# Patient Record
Sex: FEMALE | Race: WHITE | NOT HISPANIC OR LATINO | Employment: OTHER | URBAN - METROPOLITAN AREA
[De-identification: names, ages, dates, MRNs, and addresses within clinical notes are randomized per-mention and may not be internally consistent; named-entity substitution may affect disease eponyms.]

---

## 2017-01-01 ENCOUNTER — ANTI-COAG VISIT (OUTPATIENT)
Dept: CARDIOLOGY | Facility: CLINIC | Age: 82
End: 2017-01-01
Payer: MEDICARE

## 2017-01-01 ENCOUNTER — CLINICAL SUPPORT (OUTPATIENT)
Dept: CARDIOLOGY | Facility: CLINIC | Age: 82
End: 2017-01-01
Payer: MEDICARE

## 2017-01-01 ENCOUNTER — ANTI-COAG VISIT (OUTPATIENT)
Dept: CARDIOLOGY | Facility: CLINIC | Age: 82
End: 2017-01-01

## 2017-01-01 ENCOUNTER — OFFICE VISIT (OUTPATIENT)
Dept: CARDIOLOGY | Facility: CLINIC | Age: 82
End: 2017-01-01
Payer: MEDICARE

## 2017-01-01 ENCOUNTER — DOCUMENTATION ONLY (OUTPATIENT)
Dept: CARDIOLOGY | Facility: CLINIC | Age: 82
End: 2017-01-01

## 2017-01-01 ENCOUNTER — TELEPHONE (OUTPATIENT)
Dept: CARDIOLOGY | Facility: CLINIC | Age: 82
End: 2017-01-01

## 2017-01-01 ENCOUNTER — OFFICE VISIT (OUTPATIENT)
Dept: PODIATRY | Facility: CLINIC | Age: 82
End: 2017-01-01
Payer: MEDICARE

## 2017-01-01 ENCOUNTER — LAB VISIT (OUTPATIENT)
Dept: LAB | Facility: HOSPITAL | Age: 82
End: 2017-01-01
Attending: INTERNAL MEDICINE
Payer: MEDICARE

## 2017-01-01 VITALS
HEART RATE: 70 BPM | WEIGHT: 166.88 LBS | SYSTOLIC BLOOD PRESSURE: 125 MMHG | BODY MASS INDEX: 25.29 KG/M2 | DIASTOLIC BLOOD PRESSURE: 77 MMHG | HEIGHT: 68 IN

## 2017-01-01 VITALS
HEART RATE: 69 BPM | DIASTOLIC BLOOD PRESSURE: 83 MMHG | WEIGHT: 167.13 LBS | HEIGHT: 68 IN | BODY MASS INDEX: 25.33 KG/M2 | SYSTOLIC BLOOD PRESSURE: 130 MMHG

## 2017-01-01 VITALS — WEIGHT: 169.31 LBS | HEIGHT: 68 IN | BODY MASS INDEX: 25.66 KG/M2

## 2017-01-01 DIAGNOSIS — Z95.0 CARDIAC PACEMAKER IN SITU: Primary | ICD-10-CM

## 2017-01-01 DIAGNOSIS — Z79.01 LONG TERM (CURRENT) USE OF ANTICOAGULANTS: Primary | ICD-10-CM

## 2017-01-01 DIAGNOSIS — N18.4 HYPERTENSION ASSOCIATED WITH STAGE 4 CHRONIC KIDNEY DISEASE DUE TO TYPE 2 DIABETES MELLITUS: ICD-10-CM

## 2017-01-01 DIAGNOSIS — I48.20 ATRIAL FIBRILLATION, CHRONIC: Primary | ICD-10-CM

## 2017-01-01 DIAGNOSIS — D51.9 VITAMIN B12 DEFICIENCY ANEMIA: ICD-10-CM

## 2017-01-01 DIAGNOSIS — E11.22 HYPERTENSION ASSOCIATED WITH STAGE 4 CHRONIC KIDNEY DISEASE DUE TO TYPE 2 DIABETES MELLITUS: ICD-10-CM

## 2017-01-01 DIAGNOSIS — R00.1 BRADYCARDIA, SINUS: Chronic | ICD-10-CM

## 2017-01-01 DIAGNOSIS — E88.09 OTHER DISORDERS OF PLASMA PROTEIN METABOLISM: ICD-10-CM

## 2017-01-01 DIAGNOSIS — Z95.1 S/P CABG (CORONARY ARTERY BYPASS GRAFT): ICD-10-CM

## 2017-01-01 DIAGNOSIS — E55.9 UNSPECIFIED VITAMIN D DEFICIENCY: ICD-10-CM

## 2017-01-01 DIAGNOSIS — Z79.01 LONG TERM (CURRENT) USE OF ANTICOAGULANTS: ICD-10-CM

## 2017-01-01 DIAGNOSIS — Z95.0 PACEMAKER: Chronic | ICD-10-CM

## 2017-01-01 DIAGNOSIS — I12.9 HYPERTENSION ASSOCIATED WITH STAGE 4 CHRONIC KIDNEY DISEASE DUE TO TYPE 2 DIABETES MELLITUS: ICD-10-CM

## 2017-01-01 DIAGNOSIS — I48.20 ATRIAL FIBRILLATION, CHRONIC: ICD-10-CM

## 2017-01-01 DIAGNOSIS — Z95.0 CARDIAC PACEMAKER IN SITU: ICD-10-CM

## 2017-01-01 DIAGNOSIS — I25.110 CORONARY ARTERY DISEASE INVOLVING NATIVE CORONARY ARTERY OF NATIVE HEART WITH UNSTABLE ANGINA PECTORIS: ICD-10-CM

## 2017-01-01 DIAGNOSIS — I10 ESSENTIAL HYPERTENSION: ICD-10-CM

## 2017-01-01 DIAGNOSIS — I50.23 ACUTE ON CHRONIC SYSTOLIC CONGESTIVE HEART FAILURE: ICD-10-CM

## 2017-01-01 DIAGNOSIS — Z09 FOLLOW-UP EXAMINATION FOLLOWING SURGERY: Primary | ICD-10-CM

## 2017-01-01 DIAGNOSIS — N20.0 URIC ACID NEPHROLITHIASIS: ICD-10-CM

## 2017-01-01 DIAGNOSIS — I42.0 ISCHEMIC DILATED CARDIOMYOPATHY: ICD-10-CM

## 2017-01-01 DIAGNOSIS — E61.2 MAGNESIUM DEFICIENCY: ICD-10-CM

## 2017-01-01 DIAGNOSIS — I10 ESSENTIAL HYPERTENSION, MALIGNANT: ICD-10-CM

## 2017-01-01 DIAGNOSIS — N18.30 CRF (CHRONIC RENAL FAILURE), STAGE 3 (MODERATE): ICD-10-CM

## 2017-01-01 DIAGNOSIS — E87.1 HYPOSMOLALITY AND/OR HYPONATREMIA: ICD-10-CM

## 2017-01-01 DIAGNOSIS — I25.5 ISCHEMIC DILATED CARDIOMYOPATHY: ICD-10-CM

## 2017-01-01 DIAGNOSIS — N25.81 SECONDARY HYPERPARATHYROIDISM (OF RENAL ORIGIN): Primary | ICD-10-CM

## 2017-01-01 DIAGNOSIS — E11.9 TYPE II OR UNSPECIFIED TYPE DIABETES MELLITUS WITHOUT MENTION OF COMPLICATION, NOT STATED AS UNCONTROLLED: ICD-10-CM

## 2017-01-01 LAB
ALBUMIN SERPL BCP-MCNC: 3.5 G/DL
ANION GAP SERPL CALC-SCNC: 5 MMOL/L
AORTIC VALVE REGURGITATION: ABNORMAL
BASOPHILS # BLD AUTO: 0.02 K/UL
BASOPHILS NFR BLD: 0.2 %
BUN SERPL-MCNC: 37 MG/DL
CALCIUM SERPL-MCNC: 9.6 MG/DL
CHLORIDE SERPL-SCNC: 99 MMOL/L
CO2 SERPL-SCNC: 27 MMOL/L
CREAT SERPL-MCNC: 2.2 MG/DL
DIASTOLIC DYSFUNCTION: YES
DIFFERENTIAL METHOD: ABNORMAL
EOSINOPHIL # BLD AUTO: 0.5 K/UL
EOSINOPHIL NFR BLD: 5.4 %
ERYTHROCYTE [DISTWIDTH] IN BLOOD BY AUTOMATED COUNT: 14.5 %
EST. GFR  (AFRICAN AMERICAN): 23.2 ML/MIN/1.73 M^2
EST. GFR  (NON AFRICAN AMERICAN): 20.1 ML/MIN/1.73 M^2
ESTIMATED AVG GLUCOSE: 128 MG/DL
ESTIMATED PA SYSTOLIC PRESSURE: 44
GLUCOSE SERPL-MCNC: 133 MG/DL
HBA1C MFR BLD HPLC: 6.1 %
HCT VFR BLD AUTO: 39.1 %
HGB BLD-MCNC: 13.6 G/DL
INR PPP: 1.5 (ref 2–3)
INR PPP: 1.7
INR PPP: 1.7 (ref 2–3)
INR PPP: 1.7 (ref 2–3)
INR PPP: 2.6 (ref 2–3)
LYMPHOCYTES # BLD AUTO: 1.5 K/UL
LYMPHOCYTES NFR BLD: 18.2 %
MCH RBC QN AUTO: 31.3 PG
MCHC RBC AUTO-ENTMCNC: 34.8 %
MCV RBC AUTO: 90 FL
MITRAL VALVE MOBILITY: NORMAL
MITRAL VALVE REGURGITATION: ABNORMAL
MONOCYTES # BLD AUTO: 0.6 K/UL
MONOCYTES NFR BLD: 7.4 %
NEUTROPHILS # BLD AUTO: 5.7 K/UL
NEUTROPHILS NFR BLD: 68.6 %
PHOSPHATE SERPL-MCNC: 2.6 MG/DL
PLATELET # BLD AUTO: 200 K/UL
PMV BLD AUTO: 9.6 FL
POTASSIUM SERPL-SCNC: 5.8 MMOL/L
PTH-INTACT SERPL-MCNC: 135 PG/ML
RBC # BLD AUTO: 4.35 M/UL
RETIRED EF AND QEF - SEE NOTES: 30 (ref 55–65)
SODIUM SERPL-SCNC: 131 MMOL/L
TRICUSPID VALVE REGURGITATION: ABNORMAL
WBC # BLD AUTO: 8.29 K/UL

## 2017-01-01 PROCEDURE — 85025 COMPLETE CBC W/AUTO DIFF WBC: CPT

## 2017-01-01 PROCEDURE — 80069 RENAL FUNCTION PANEL: CPT

## 2017-01-01 PROCEDURE — 99215 OFFICE O/P EST HI 40 MIN: CPT | Mod: S$PBB,,, | Performed by: INTERNAL MEDICINE

## 2017-01-01 PROCEDURE — 99999 PR PBB SHADOW E&M-EST. PATIENT-LVL II: CPT | Mod: PBBFAC,,, | Performed by: INTERNAL MEDICINE

## 2017-01-01 PROCEDURE — 99211 OFF/OP EST MAY X REQ PHY/QHP: CPT | Mod: PBBFAC,27,PO | Performed by: PHARMACIST

## 2017-01-01 PROCEDURE — 83970 ASSAY OF PARATHORMONE: CPT

## 2017-01-01 PROCEDURE — 99999 PR PBB SHADOW E&M-EST. PATIENT-LVL I: CPT | Mod: PBBFAC,,, | Performed by: PHARMACIST

## 2017-01-01 PROCEDURE — 85610 PROTHROMBIN TIME: CPT | Mod: PBBFAC,PO | Performed by: PHARMACIST

## 2017-01-01 PROCEDURE — 99211 OFF/OP EST MAY X REQ PHY/QHP: CPT | Mod: PBBFAC,PO | Performed by: PHARMACIST

## 2017-01-01 PROCEDURE — 83036 HEMOGLOBIN GLYCOSYLATED A1C: CPT

## 2017-01-01 PROCEDURE — 99024 POSTOP FOLLOW-UP VISIT: CPT | Mod: ,,,

## 2017-01-01 PROCEDURE — 99214 OFFICE O/P EST MOD 30 MIN: CPT | Mod: S$PBB,,, | Performed by: INTERNAL MEDICINE

## 2017-01-01 PROCEDURE — 99212 OFFICE O/P EST SF 10 MIN: CPT | Mod: PBBFAC,PO

## 2017-01-01 PROCEDURE — 93280 PM DEVICE PROGR EVAL DUAL: CPT | Mod: PBBFAC,PO | Performed by: INTERNAL MEDICINE

## 2017-01-01 PROCEDURE — 99999 PR PBB SHADOW E&M-EST. PATIENT-LVL III: CPT | Mod: PBBFAC,,, | Performed by: INTERNAL MEDICINE

## 2017-01-01 PROCEDURE — 36415 COLL VENOUS BLD VENIPUNCTURE: CPT | Mod: PO

## 2017-01-01 PROCEDURE — 99999 PR PBB SHADOW E&M-EST. PATIENT-LVL II: CPT | Mod: PBBFAC,,,

## 2017-01-01 PROCEDURE — 93306 TTE W/DOPPLER COMPLETE: CPT | Mod: PBBFAC,PO | Performed by: INTERNAL MEDICINE

## 2017-01-01 PROCEDURE — 99212 OFFICE O/P EST SF 10 MIN: CPT | Mod: PBBFAC,PO | Performed by: INTERNAL MEDICINE

## 2017-01-01 RX ORDER — DILTIAZEM HYDROCHLORIDE 240 MG/1
240 CAPSULE, COATED, EXTENDED RELEASE ORAL NIGHTLY
Status: ON HOLD | COMMUNITY
Start: 2017-01-01 | End: 2017-01-01

## 2017-01-19 NOTE — PROGRESS NOTES
Chief Complaint   Patient presents with    Nail Problem     Left Gr.toe  PCP [id number]/7/16 A1C 11/4/16 5.9     Patient returns status post partial nail avulsiomn  x 2 weeks.  Patient states doing well. Patient is doing the soaks in Epsom salt and local wound care with triple antibiotic and a Band-aid. Patient has decreased pain, swelling and redness.     PE:    Avulsion site looks clean without any signs of cryptosis, edema or erythema.    Assessment:  Doing well postoperatively.        Plan:   Avulsion site was cleansed and debrided.  Discontinue the soaks. Continue the antifungal cream. Return to clinic 3 months diabetic foot care.

## 2017-01-19 NOTE — MR AVS SNAPSHOT
[facility]  [address]  [place] [place] [address]  Phone: [phone]                  Paty Romero   2017 2:45 PM   Office Visit    Description:  Female : 1933   Provider:  Iglesia Winkler DPM   Department:  [facility]           Reason for Visit     Nail Problem                To Do List           Future Appointments        Provider Department Dept Phone    2017 1:00 PM PACEMAKER [facility] [phone]    2017 1:40 PM Malik Lopez MD [facility] Arrhythmia [phone]    2017 1:45 PM Yuliet Lay, PharmD [facility] [phone]    2017 1:45 PM Lissy Coleman MD [facility] Dermatology [phone]    2017 9:45 AM Jcak Marsh MD [facility] Cardiology [phone]      Goals (5 Years of Data)     None      [facility]     [facility] -  Assistance  Registered nurses in the [facility] provide clinical advisement, health education, appointment booking, and other advisory services.  Call for this free service at [phone].             Medications           Message regarding Medications     Verify the changes and/or additions to your medication regime listed below are the same as discussed with your clinician today.  If any of these changes or additions are incorrect, please notify your healthcare provider.             Verify that the below list of medications is an accurate representation of the medications you are currently taking.  If none reported, the list may be blank. If incorrect, please contact your healthcare provider. Carry this list with you in case of emergency.           Current Medications     ascorbic acid (VITAMIN C) 1000 MG tablet Take 1,000 mcg by mouth once daily.     calcitRIOL (ROCALTROL) 0.5 MCG Cap 0.5 mcg once daily.     clopidogrel (PLAVIX) 75 mg tablet Take 1 tablet (75 mg total) by mouth once daily.    CYANOCOBALAMIN, VITAMIN  "B-12, (B12-RESIN) 1,000 mcg Tab Take 1,000 mcg by mouth once daily. 1 Tablet(s) Oral PRN Every day.    furosemide (LASIX) 40 MG tablet Take 40 mg by mouth as needed. Do not take Monday-Wednesday, Friday    glipiZIDE (GLUCOTROL) 5 MG tablet TAKE 0.5 TABLETS (2.5 MG TOTAL) BY MOUTH 2 (TWO) TIMES DAILY WITH MEALS.    levothyroxine (SYNTHROID) 112 MCG tablet Take 112 mcg by mouth once daily.      magnesium chloride (SLOW-MAG) 71.5 mg TbEC Take 1 tablet by mouth once daily.    metoprolol tartrate (LOPRESSOR) 100 MG tablet Take 1 tablet (100 mg total) by mouth 2 (two) times daily.    nitroGLYCERIN (NITROSTAT) 0.4 MG SL tablet Place 1 tablet (0.4 mg total) under the tongue every 5 (five) minutes as needed for Chest pain.    omega-3 fatty acids (FISH OIL) 300 mg Cap Take 300 mg by mouth once daily. Every day    omeprazole (PRILOSEC OTC) 20 MG tablet Take 20 mg by mouth once daily.    pravastatin (PRAVACHOL) 40 MG tablet Take 1 tablet (40 mg total) by mouth once daily.    spironolactone (ALDACTONE) 25 MG tablet Take 0.5 tablets (12.5 mg total) by mouth once daily.    temazepam (RESTORIL) 30 mg capsule Take 1 capsule (30 mg total) by mouth every evening.    warfarin (COUMADIN) 3 MG tablet Take 6 mg po daily (11/26, 11/27, 11/28) then PT INR will be checked on 11/29/16 and further dose per [facility].    docusate sodium (COLACE) 100 MG capsule Take 100 mg by mouth 2 (two) times daily. Every evening           Clinical Reference Information           Vital Signs - Last Recorded  Most recent update: 1/19/2017  2:39 PM by Lynne Medrano LPN    Ht Wt BMI          5' 8" (1.727 m) 76.8 kg (169 lb 5 oz) 25.74 kg/m2        Allergies as of 1/19/2017     No Known Drug Allergies      Immunizations Administered on Date of Encounter - 1/19/2017     None      [facility] Sign-Up     Activating your [facility] account is as easy as 1-2-3!     1) Visit [email or web address], select Sign Up Now, enter this activation code and your date of " birth, then select Next.  [email or web address]  Expires: 3/5/2017  3:09 PM      2) Create a username and password to use when you visit [facility] in the future and select a security question in case you lose your password and select Next.    3) Enter your e-mail address and click Sign Up!    Additional Information  If you have questions, please e-mail [email or web address] or call [phone] to talk to our [facility] staff. Remember, [facility] is NOT to be used for urgent needs. For medical emergencies, dial 911.

## 2017-01-20 PROBLEM — I25.5 ISCHEMIC DILATED CARDIOMYOPATHY: Status: ACTIVE | Noted: 2017-01-01

## 2017-01-20 PROBLEM — I42.0 ISCHEMIC DILATED CARDIOMYOPATHY: Status: ACTIVE | Noted: 2017-01-01

## 2017-01-20 NOTE — PROGRESS NOTES
Subjective:    Patient ID:  Paty Romero is a 83 y.o. female who presents for follow-up of Pacemaker Check (Follow up- afib, had 2 stents placed in November 2016) and Atrial Fibrillation      HPI 84 yo female with Htn, CAD, DM, CRI Stage IV, CABG, tachy-michelle syndrome, PPM, hypothyroidism, atrial fibrillation.  Has longstanding history of atrial fibrillation. Has been on amiodarone since 2012, with DCCV 2/16, 6/16.   Seen by me in 2016, after she developed recurrence of AF, persistent.  We discussed management options, and we ultimately opted for rate control strategy.  Admitted 11/16 with acute decompensated heart failure, EF noted to be 25%, with severe MR.  Underwent PCI to OM and Cx.  EF previously 55%.  Device interrogation reveals chronic atrial fibrillation with RV pacing 85%.  Feeling better in terms of dyspnea as compared to when she was hospitalized in November.  Still notes significant dyspnea.    Review of Systems   Constitution: Negative. Negative for weakness and malaise/fatigue.   Cardiovascular: Negative for chest pain, dyspnea on exertion, irregular heartbeat, leg swelling, near-syncope, orthopnea, palpitations, paroxysmal nocturnal dyspnea and syncope.   Respiratory: Negative for cough and sleep disturbances due to breathing.    Neurological: Negative for dizziness and light-headedness.   All other systems reviewed and are negative.       Objective:    Physical Exam   Constitutional: She is oriented to person, place, and time. She appears well-developed and well-nourished.   Eyes: Conjunctivae are normal. No scleral icterus.   Neck: No JVD present. No tracheal deviation present.   Cardiovascular: Normal rate and regular rhythm.  PMI is not displaced.    Murmur heard.  High-pitched blowing holosystolic murmur is present with a grade of 3/6  at the apex  Pulmonary/Chest: Effort normal and breath sounds normal. No respiratory distress.   Abdominal: Soft. There is no hepatosplenomegaly. There is no  tenderness.   Musculoskeletal: She exhibits no edema or tenderness.   Neurological: She is alert and oriented to person, place, and time.   Skin: Skin is warm and dry. No rash noted.   Psychiatric: She has a normal mood and affect. Her behavior is normal.         Assessment:       1. Atrial fibrillation, chronic    2. Coronary artery disease involving native coronary artery of native heart with unstable angina pectoris    3. Acute on chronic systolic congestive heart failure    4. Hypertension associated with stage 4 chronic kidney disease due to type 2 diabetes mellitus    5. CRF (chronic renal failure), stage 3 (moderate)    6. Uncontrolled type 2 diabetes mellitus with complication, without long-term current use of insulin    7. S/P CABG (coronary artery bypass graft)    8. Pacemaker         Plan:           Worsening cardiomyopathy with severe MR, with chronic RV pacing.  Recommend upgrade to CRT, and if no improvement, AVN ablation.  We discussed risks and benefits, and she would like to pursue this strategy.  Recommend repeating echo in one month.  If EF remains < 35, discuss upgrade to CRT-D or CRT-P.  She is to see Dr. Marsh in the next couple of weeks.  She prefers to have echo and upgrade locally, and thus I will ask him to arrange.

## 2017-01-20 NOTE — PROGRESS NOTES
INR subtherapeutic, no explanation for this level today, but INR has been trending downward since this dose was started in Nov.  Pt saw Jessica today, no changes, but she is to f/u lizet Mclain regarding 3rd lead addition to PPM.  Will increase dose and recheck in 10 days

## 2017-01-30 NOTE — PROGRESS NOTES
INr still subtherapeutic.  Pt confirms dose.  Denies any missed.  She was started on ceftin sat for UTI, no other changes.  Increase dose and recheck in 10 days

## 2017-02-10 NOTE — PROGRESS NOTES
INr has not moved.  Pt reports ASA d/c and pt started on cardizem 240mg CD.  No other changes.  Dose is confirmed.  Increased again, boost today & recheck in about 2 weeks

## 2017-02-10 NOTE — MR AVS SNAPSHOT
[facility]  [address] [facility] [address]  [place] [address]  Phone: [phone]                  Paty Romero   2/10/2017 2:30 PM   Anti-coag visit    Description:  Female : 1933   Provider:  Yuliet Lay PharmD   Department:  [facility]           Diagnoses this Visit        Comments    Long term (current) use of anticoagulants    -  Primary     Atrial fibrillation, chronic                To Do List           Future Appointments        Provider Department Dept Phone    2017 10:30 AM Yuliet Lay, PharmD [facility] [phone]    2017 11:00 AM Jack Marsh MD [facility] Cardiology [phone]    4/3/2017 1:40 PM Ayana Santiago MD [facility] Internal Medicine [phone]    2017 1:15 PM Iglesia Winkler DPM [facility] [phone]      Goals (5 Years of Data)     None      [facility]     [facility] Care [facility] -  Assistance  Registered nurses in the [facility] provide clinical advisement, health education, appointment booking, and other advisory services.  Call for this free service at [phone].             Medications           Message regarding Medications     Verify the changes and/or additions to your medication regime listed below are the same as discussed with your clinician today.  If any of these changes or additions are incorrect, please notify your healthcare provider.             Verify that the below list of medications is an accurate representation of the medications you are currently taking.  If none reported, the list may be blank. If incorrect, please contact your healthcare provider. Carry this list with you in case of emergency.           Current Medications     ascorbic acid (VITAMIN C) 1000 MG tablet Take 1,000 mcg by mouth once daily.     calcitRIOL (ROCALTROL) 0.5 MCG Cap 0.5 mcg once daily.     clopidogrel (PLAVIX) 75 mg tablet Take 1 tablet (75 mg total) by mouth  once daily.    CYANOCOBALAMIN, VITAMIN B-12, (B12-RESIN) 1,000 mcg Tab Take 1,000 mcg by mouth once daily. 1 Tablet(s) Oral PRN Every day.    docusate sodium (COLACE) 100 MG capsule Take 100 mg by mouth 2 (two) times daily. Every evening    furosemide (LASIX) 40 MG tablet Take 40 mg by mouth as needed. Do not take Monday-Wednesday, Friday    glipiZIDE (GLUCOTROL) 5 MG tablet Take one-half tablet by  mouth two times daily with  meals    levothyroxine (SYNTHROID) 112 MCG tablet Take 112 mcg by mouth once daily.      magnesium chloride (SLOW-MAG) 71.5 mg TbEC Take 1 tablet by mouth once daily.    metoprolol tartrate (LOPRESSOR) 100 MG tablet Take 1 tablet (100 mg total) by mouth 2 (two) times daily.    nitroGLYCERIN (NITROSTAT) 0.4 MG SL tablet Place 1 tablet (0.4 mg total) under the tongue every 5 (five) minutes as needed for Chest pain.    omega-3 fatty acids (FISH OIL) 300 mg Cap Take 300 mg by mouth once daily. Every day    omeprazole (PRILOSEC OTC) 20 MG tablet Take 20 mg by mouth once daily.    pravastatin (PRAVACHOL) 40 MG tablet Take 1 tablet (40 mg total) by mouth once daily.    spironolactone (ALDACTONE) 25 MG tablet Take 0.5 tablets (12.5 mg total) by mouth once daily.    temazepam (RESTORIL) 30 mg capsule Take 1 capsule (30 mg total) by mouth every evening.    warfarin (COUMADIN) 3 MG tablet Take 6 mg po daily (11/26, 11/27, 11/28) then PT INR will be checked on 11/29/16 and further dose per [facility].           Clinical Reference Information           Allergies as of 2/10/2017     No Known Drug Allergies      Immunizations Administered on Date of Encounter - 2/10/2017     None      Orders Placed During Today's Visit      Normal Orders This Visit    POCT INR          2/10/2017  2:53 PM - Yuliet Lay, PharmD      Component Results     Component    INR    1.7      January 2017 Details    Sun Mon Tue Wed Thu Fri Sat     1               2               3               4               5                6               7                 8               9               10               11      3 mg         12      3 mg         13      3 mg         14      3 mg           15      3 mg         16      3 mg         17      3 mg         18      3 mg         19      3 mg         20   1.5   4.5 mg   See details      21      3 mg           22      3 mg         23      3 mg         24      3 mg         25      3 mg         26      3 mg         27      4.5 mg         28      3 mg           29      3 mg         30   1.7   4.5 mg   See details      31      3 mg              Date Details   01/20 INR: 1.5      01/30 Last INR check   INR: 1.7                 February 2017 Details    Sun Mon Tue Wed Thu Fri Sat        1      3 mg         2      3 mg         3      4.5 mg         4      3 mg           5      3 mg         6      4.5 mg         7      3 mg         8      3 mg         9      3 mg         10   1.7   6 mg   See details      11      3 mg           12      3 mg         13      4.5 mg         14      3 mg         15      4.5 mg         16      3 mg         17      4.5 mg         18      3 mg           19      3 mg         20      4.5 mg         21      3 mg         22            23               24               25                 26               27               28                    Date Details   02/10 This INR check   INR: 1.7       Date of next INR:  2/22/2017               How to take your warfarin dose     To take:  3 mg Take 1 of the 3 mg tablets.    To take:  4.5 mg Take 1.5 of the 3 mg tablets.    To take:  6 mg Take 2 of the 3 mg tablets.           Anticoagulation Summary as of 2/10/2017     Maintenance plan 4.5 mg (3 mg x 1.5) on Mon, Wed, Fri; 3 mg (3 mg x 1) all other days    Full instructions 2/10: 6 mg; Otherwise 4.5 mg on Mon, Wed, Fri; 3 mg all other days    Next INR check 2/22/2017      Anticoagulation Episode Summary     Comments [facility] no DDI with Cipro no ddi cipro 11/13        Patient Findings      Positives Change in medications    Comments ASA d/c and pt started on cardizem 240mg CD      [facility] Sign-Up     Activating your [facility] account is as easy as 1-2-3!     1) Visit [email or web address], select Sign Up Now, enter this activation code and your date of birth, then select Next.  [email or web address]  Expires: 3/5/2017  3:09 PM      2) Create a username and password to use when you visit [facility] in the future and select a security question in case you lose your password and select Next.    3) Enter your e-mail address and click Sign Up!    Additional Information  If you have questions, please e-mail [email or web address] or call [phone] to talk to our [facility] staff. Remember, [facility] is NOT to be used for urgent needs. For medical emergencies, dial 911.         Language Assistance Services     ATTENTION: Language assistance services are available, free of charge. Please call [phone].      ATENCIÓN: Si habla español, tiene a morales disposición servicios gratuitos de asistencia lingüística. Llame al [phone].     ANITA Ý: N?u b?n nói Ti?ng Vi?t, có các d?ch v? h? tr? ngôn ng? mi?n phí dành cho b?n. G?i s? [phone].         [facility] complies with applicable Federal civil rights laws and does not discriminate on the basis of race, color, national origin, age, disability, or sex.

## 2017-02-22 NOTE — PROGRESS NOTES
INR in range. Pt confirms dose.  Pt reports has had a cold, sore throat, not taking anything OTC, appetite is decreased, eating mostly soup.  Encouraged dietary intake to maintain strength and avoid further increase of INR.  Pt seeing Chemo today, will call with changes.  Continue dose and recheck in 3 weeks

## 2017-02-22 NOTE — PROGRESS NOTES
Subjective:    Patient ID:  Paty Romero is a 83 y.o. female who presents for follow-up of Atrial Fibrillation      HPI  She comes with no complaints, no chest pain, no shortness of breath      Review of Systems   Constitution: Negative for decreased appetite, weakness, malaise/fatigue, weight gain and weight loss.   Cardiovascular: Negative for chest pain, dyspnea on exertion, leg swelling, palpitations and syncope.   Respiratory: Negative for cough and shortness of breath.    Gastrointestinal: Negative.    All other systems reviewed and are negative.       Objective:    Physical Exam   Constitutional: She is oriented to person, place, and time. She appears well-developed and well-nourished.   HENT:   Head: Normocephalic.   Eyes: Pupils are equal, round, and reactive to light.   Neck: Normal range of motion. Neck supple. No JVD present. Carotid bruit is not present. No thyromegaly present.   Cardiovascular: Normal rate, regular rhythm, normal heart sounds, intact distal pulses and normal pulses.  PMI is not displaced.  Exam reveals no gallop.    No murmur heard.  Pulmonary/Chest: Effort normal and breath sounds normal.   Abdominal: Soft. Normal appearance. She exhibits no mass. There is no hepatosplenomegaly. There is no tenderness.   Musculoskeletal: Normal range of motion. She exhibits no edema.   Neurological: She is alert and oriented to person, place, and time. She has normal strength and normal reflexes. No sensory deficit.   Skin: Skin is warm and intact.   Psychiatric: She has a normal mood and affect.   Nursing note and vitals reviewed.        Assessment:       1. Atrial fibrillation, chronic    2. Coronary artery disease involving native coronary artery of native heart with unstable angina pectoris    3. Essential hypertension    4. Bradycardia, sinus    5. Hypertension associated with stage 4 chronic kidney disease due to type 2 diabetes mellitus    6. S/P CABG (coronary artery bypass graft)    7.  Pacemaker         Plan:   Will repeat echo, consider LV lead if EF still less than 35%   Call with results  Continue all cardiac medications

## 2017-03-08 NOTE — PROGRESS NOTES
Spoke with patient regarding low EF per last ECHO.  Pt notified of Dr. Marsh's recommendation to proceed with BI-V lead placement as discussed at her last visit.  Pt wishes to proceed with this procedure.  Message forwarded to Dr. Marsh's nurse for scheduling next week.

## 2017-03-09 NOTE — TELEPHONE ENCOUNTER
----- Message from Becca Lopez RN sent at 3/8/2017  1:50 PM CST -----  Regarding: Needs upgrade scheduled    I spoke with Dr. Mclain this morning.  He wanted us to notify Mrs. Romero MRN [id number], that he read her echo and her heart function is still down so he wants to proceed with placing the Bi-V lead as they discussed at her last visit.  He wants to do this next week.  I just spoke with the patient and she does want to proceed.  Would you mind calling her to schedule?  She is at [phone].  He also wants to switch from [employer] to [employer].

## 2017-03-09 NOTE — TELEPHONE ENCOUNTER
----- Message from Shy Avalos sent at 3/9/2017  2:31 PM CST -----  Patient is calling office because doctor wants to do a heart procedure on her next week but patient has not heard from office concerning this procedure. She is on coumadin and needs to know when she has to stop taking it. Please call to advise and answer questions at [phone].

## 2017-03-14 PROBLEM — E87.1 HYPONATREMIA: Status: ACTIVE | Noted: 2017-01-01

## 2017-03-14 NOTE — TELEPHONE ENCOUNTER
----- Message from Becca Lopez RN sent at 3/8/2017  1:50 PM CST -----  Regarding: Needs upgrade scheduled    I spoke with Dr. Mclain this morning.  He wanted us to notify Mrs. Romero MRN [id number], that he read her echo and her heart function is still down so he wants to proceed with placing the Bi-V lead as they discussed at her last visit.  He wants to do this next week.  I just spoke with the patient and she does want to proceed.  Would you mind calling her to schedule?  She is at [phone].  He also wants to switch from [employer] to [employer].

## 2017-03-15 PROBLEM — I27.20 PULMONARY HTN: Status: ACTIVE | Noted: 2017-01-01

## 2017-03-20 NOTE — PROGRESS NOTES
Pt released form Hospital on 3/18; on Keflex 500mg Q 8HR x 6 days to prevent wound infection (had another lead added to pacemaker). Sent home on 6mg coumadin daily. HH is to get level tomorrow

## 2017-03-23 PROBLEM — Z76.89 ENCOUNTER FOR ASSESSMENT FOR SMALL BOWEL OBSTRUCITON: Status: ACTIVE | Noted: 2017-01-01

## 2017-03-29 PROBLEM — N17.9 ACUTE RENAL FAILURE: Status: ACTIVE | Noted: 2017-01-01

## 2017-03-30 PROBLEM — E87.20 METABOLIC ACIDOSIS WITH INCREASED ANION GAP AND ACCUMULATION OF ORGANIC ACIDS: Status: ACTIVE | Noted: 2017-01-01

## 2017-03-30 PROBLEM — K72.00 SHOCK LIVER: Status: ACTIVE | Noted: 2017-01-01

## 2017-04-01 PROBLEM — N18.30 ACUTE RENAL FAILURE SUPERIMPOSED ON STAGE 3 CHRONIC KIDNEY DISEASE: Status: ACTIVE | Noted: 2017-01-01

## 2017-04-06 PROBLEM — D50.9 IRON DEFICIENCY ANEMIA: Status: ACTIVE | Noted: 2017-01-01

## 2017-04-06 PROBLEM — E46 MALNUTRITION: Status: ACTIVE | Noted: 2017-01-01

## 2017-09-11 NOTE — MR AVS SNAPSHOT
[place] [facility]  [address] [facility] [address]  [place] [address]  Phone: [phone]                  Paty Romero   2017 10:30 AM   Anti-coag visit    Description:  Female : 1933   Provider:  Yuliet Lay, PharmD   Department:  [place] [facility]           Diagnoses this Visit        Comments    Long term (current) use of anticoagulants    -  Primary     Atrial fibrillation, chronic                To Do List           Future Appointments        Provider Department Dept Phone    2017 11:00 AM Jcak Marsh MD [facility] Card[facility] [phone]    3/15/2017 2:45 PM Yuliet Lay, PharmD [facility] [phone]    4/3/2017 1:40 PM Ayana Santiago MD [facility] Internal Medicine [phone]    2017 1:15 PM Iglesia Winkler DPM [facility] Podiatry [phone]      Goals (5 Years of Data)     None      [facility]     [facility] -  Assistance  Registered nurses in the [facility] provide clinical advisement, health education, appointment booking, and other advisory services.  Call for this free service at [phone].             Medications           Message regarding Medications     Verify the changes and/or additions to your medication regime listed below are the same as discussed with your clinician today.  If any of these changes or additions are incorrect, please notify your healthcare provider.             Verify that the below list of medications is an accurate representation of the medications you are currently taking.  If none reported, the list may be blank. If incorrect, please contact your healthcare provider. Carry this list with you in case of emergency.           Current Medications     ascorbic acid (VITAMIN C) 1000 MG tablet Take 1,000 mcg by mouth once daily.     calcitRIOL (ROCALTROL) 0.5 MCG Cap 0.5 mcg once daily.     clopidogrel (PLAVIX) 75 mg tablet Take 1 tablet (75 mg total) by mouth  once daily.    CYANOCOBALAMIN, VITAMIN B-12, (B12-RESIN) 1,000 mcg Tab Take 1,000 mcg by mouth once daily. 1 Tablet(s) Oral PRN Every day.    docusate sodium (COLACE) 100 MG capsule Take 100 mg by mouth 2 (two) times daily. Every evening    furosemide (LASIX) 40 MG tablet Take 40 mg by mouth as needed. Do not take Monday-Wednesday, Friday    glipiZIDE (GLUCOTROL) 5 MG tablet Take one-half tablet by  mouth two times daily with  meals    levothyroxine (SYNTHROID) 112 MCG tablet Take 112 mcg by mouth once daily.      magnesium chloride (SLOW-MAG) 71.5 mg TbEC Take 1 tablet by mouth once daily.    metoprolol tartrate (LOPRESSOR) 100 MG tablet Take 1 tablet (100 mg total) by mouth 2 (two) times daily.    nitroGLYCERIN (NITROSTAT) 0.4 MG SL tablet Place 1 tablet (0.4 mg total) under the tongue every 5 (five) minutes as needed for Chest pain.    omega-3 fatty acids (FISH OIL) 300 mg Cap Take 300 mg by mouth once daily. Every day    omeprazole (PRILOSEC OTC) 20 MG tablet Take 20 mg by mouth once daily.    pravastatin (PRAVACHOL) 40 MG tablet Take 1 tablet (40 mg total) by mouth once daily.    spironolactone (ALDACTONE) 25 MG tablet Take 0.5 tablets (12.5 mg total) by mouth once daily.    temazepam (RESTORIL) 30 mg capsule Take 1 capsule (30 mg total) by mouth every evening.    warfarin (COUMADIN) 3 MG tablet Take 6 mg po daily (11/26, 11/27, 11/28) then PT INR will be checked on 11/29/16 and further dose per [facility].           Clinical Reference Information           Allergies as of 2/22/2017     No Known Drug Allergies      Immunizations Administered on Date of Encounter - 2/22/2017     None      Orders Placed During Today's Visit      Normal Orders This Visit    POCT INR          2/22/2017 10:40 AM - [place] Lay, PharmD      Component Results     Component    INR    2.6      January 2017 Details    Sun Mon Tue Wed Thu Fri Sat     1               2               3               4               5                6               7                 8               9               10               11               12               13               14                 15               16               17               18               19               20               21                 22               23      3 mg         24      3 mg         25      3 mg         26      3 mg         27      4.5 mg         28      3 mg           29      3 mg         30   1.7   4.5 mg   See details      31      3 mg              Date Details   01/30 INR: 1.7                 February 2017 Details    Sun Mon Tue Wed Thu Fri Sat        1      3 mg         2      3 mg         3      4.5 mg         4      3 mg           5      3 mg         6      4.5 mg         7      3 mg         8      3 mg         9      3 mg         10   1.7   6 mg   See details      11      3 mg           12      3 mg         13      4.5 mg         14      3 mg         15      4.5 mg         16      3 mg         17      4.5 mg         18      3 mg           19      3 mg         20      4.5 mg         21      3 mg         22   2.6   4.5 mg   See details      23      3 mg         24      4.5 mg         25      3 mg           26      3 mg         27      4.5 mg         28      3 mg              Date Details   02/10 Last INR check   INR: 1.7      02/22 This INR check   INR: 2.6                     How to take your warfarin dose     To take:  3 mg Take 1 of the 3 mg tablets.    To take:  4.5 mg Take 1.5 of the 3 mg tablets.           March 2017 Details    Sun Mon Tue Wed Thu Fri Sat        1      4.5 mg         2      3 mg         3      4.5 mg         4      3 mg           5      3 mg         6      4.5 mg         7      3 mg         8      4.5 mg         9      3 mg         10      4.5 mg         11      3 mg           12      3 mg         13      4.5 mg         14      3 mg         15            16               17               18                 19               20                21               22               23               24               25                 26               27               28               29               30               31                 Date Details   No additional details    Date of next INR:  3/15/2017         How to take your warfarin dose     To take:  3 mg Take 1 of the 3 mg tablets.    To take:  4.5 mg Take 1.5 of the 3 mg tablets.           Anticoagulation Summary as of 2/22/2017     Maintenance plan 4.5 mg (3 mg x 1.5) on Mon, Wed, Fri; 3 mg (3 mg x 1) all other days    Full instructions 4.5 mg on Mon, Wed, Fri; 3 mg all other days    Next INR check 3/15/2017      Anticoagulation Episode Summary     Comments [facility] no DDI with Cipro no ddi cipro 11/13        Patient Findings     Positives Change in diet/appetite, Other complaints    Negatives Signs/symptoms of thrombosis, Signs/symptoms of bleeding, Laboratory test error suspected, Change in health, Change in alcohol use, Change in activity, Upcoming invasive procedure, Emergency department visit, Upcoming dental procedure, Missed doses, Extra doses, Change in medications, Hospital admission, Bruising    Comments Pt reports has had a cold, sore throat, not taking anything OTC, appetite is decreased, eating mostly soup      [facility] Sign-Up     Activating your [facility] account is as easy as 1-2-3!     1) Visit [email or web address], select Sign Up Now, enter this activation code and your date of birth, then select Next.  [email or web address]  Expires: 3/5/2017  3:09 PM      2) Create a username and password to use when you visit [facility] in the future and select a security question in case you lose your password and select Next.    3) Enter your e-mail address and click Sign Up!    Additional Information  If you have questions, please e-mail [email or web address] or call [phone] to talk to our [facility] staff. Remember, [facility] is NOT to be used for urgent needs. For medical emergencies, dial 911.          Language Assistance Services     ATTENTION: Language assistance services are available, free of charge. Please call [phone].      ATENCIÓN: Si habla kj, tiene a morales disposición servicios gratuitos de asistencia lingüística. Llame al [phone].     CHÚ Ý: N?u b?n nói Ti?ng Vi?t, có các d?ch v? h? tr? ngôn ng? mi?n phí dành cho b?n. G?i s? [phone].         [facility] [facility] complies with applicable Federal civil rights laws and does not discriminate on the basis of race, color, national origin, age, disability, or sex.         Statement Selected
